# Patient Record
Sex: FEMALE | URBAN - METROPOLITAN AREA
[De-identification: names, ages, dates, MRNs, and addresses within clinical notes are randomized per-mention and may not be internally consistent; named-entity substitution may affect disease eponyms.]

---

## 2024-09-12 ENCOUNTER — APPOINTMENT (RX ONLY)
Dept: URBAN - METROPOLITAN AREA CLINIC 53 | Facility: CLINIC | Age: 34
Setting detail: DERMATOLOGY
End: 2024-09-12

## 2024-09-12 DIAGNOSIS — D22 MELANOCYTIC NEVI: ICD-10-CM | Status: INADEQUATELY CONTROLLED

## 2024-09-12 DIAGNOSIS — D485 NEOPLASM OF UNCERTAIN BEHAVIOR OF SKIN: ICD-10-CM | Status: INADEQUATELY CONTROLLED

## 2024-09-12 DIAGNOSIS — L82.0 INFLAMED SEBORRHEIC KERATOSIS: ICD-10-CM | Status: INADEQUATELY CONTROLLED

## 2024-09-12 PROBLEM — D22.5 MELANOCYTIC NEVI OF TRUNK: Status: ACTIVE | Noted: 2024-09-12

## 2024-09-12 PROBLEM — D48.5 NEOPLASM OF UNCERTAIN BEHAVIOR OF SKIN: Status: ACTIVE | Noted: 2024-09-12

## 2024-09-12 PROCEDURE — ? COUNSELING

## 2024-09-12 PROCEDURE — 11306 SHAVE SKIN LESION 0.6-1.0 CM: CPT

## 2024-09-12 PROCEDURE — 17110 DESTRUCTION B9 LES UP TO 14: CPT | Mod: 59

## 2024-09-12 PROCEDURE — ? LIQUID NITROGEN

## 2024-09-12 PROCEDURE — ? PRESCRIPTION

## 2024-09-12 PROCEDURE — ? EXCISION

## 2024-09-12 PROCEDURE — 13100 CMPLX RPR TRUNK 1.1-2.5 CM: CPT | Mod: 59

## 2024-09-12 PROCEDURE — ? SHAVE REMOVAL

## 2024-09-12 PROCEDURE — 11311 SHAVE SKIN LESION 0.6-1.0 CM: CPT

## 2024-09-12 PROCEDURE — 11403 EXC TR-EXT B9+MARG 2.1-3CM: CPT | Mod: 59

## 2024-09-12 RX ORDER — MUPIROCIN 20 MG/G
OINTMENT TOPICAL BID
Qty: 22 | Refills: 0 | Status: ERX | COMMUNITY
Start: 2024-09-12

## 2024-09-12 RX ADMIN — MUPIROCIN: 20 OINTMENT TOPICAL at 00:00

## 2024-09-12 ASSESSMENT — LOCATION ZONE DERM
LOCATION ZONE: FACE
LOCATION ZONE: TRUNK
LOCATION ZONE: SCALP
LOCATION ZONE: TRUNK
LOCATION ZONE: FACE

## 2024-09-12 ASSESSMENT — LOCATION DETAILED DESCRIPTION DERM
LOCATION DETAILED: RIGHT CENTRAL MALAR CHEEK
LOCATION DETAILED: RIGHT INFERIOR PREAURICULAR CHEEK
LOCATION DETAILED: LEFT MID-UPPER BACK
LOCATION DETAILED: RIGHT CENTRAL FRONTAL SCALP
LOCATION DETAILED: SUPERIOR THORACIC SPINE
LOCATION DETAILED: RIGHT LATERAL MALAR CHEEK
LOCATION DETAILED: LEFT INFERIOR MEDIAL UPPER BACK
LOCATION DETAILED: LEFT INFERIOR MEDIAL UPPER BACK
LOCATION DETAILED: LEFT MID-UPPER BACK

## 2024-09-12 ASSESSMENT — LOCATION SIMPLE DESCRIPTION DERM
LOCATION SIMPLE: RIGHT CHEEK
LOCATION SIMPLE: LEFT UPPER BACK
LOCATION SIMPLE: LEFT UPPER BACK
LOCATION SIMPLE: RIGHT CHEEK
LOCATION SIMPLE: SCALP
LOCATION SIMPLE: UPPER BACK

## 2024-09-25 ENCOUNTER — RX ONLY (OUTPATIENT)
Age: 34
Setting detail: RX ONLY
End: 2024-09-25

## 2024-09-25 ENCOUNTER — APPOINTMENT (RX ONLY)
Dept: URBAN - METROPOLITAN AREA CLINIC 53 | Facility: CLINIC | Age: 34
Setting detail: DERMATOLOGY
End: 2024-09-25

## 2024-09-25 DIAGNOSIS — D22 MELANOCYTIC NEVI: ICD-10-CM | Status: STABLE

## 2024-09-25 PROBLEM — D22.5 MELANOCYTIC NEVI OF TRUNK: Status: ACTIVE | Noted: 2024-09-25

## 2024-09-25 PROCEDURE — 99024 POSTOP FOLLOW-UP VISIT: CPT

## 2024-09-25 PROCEDURE — ? COUNSELING

## 2024-09-25 PROCEDURE — ? SUTURE REMOVAL (GLOBAL PERIOD)

## 2024-09-25 RX ORDER — MUPIROCIN 20 MG/G
OINTMENT TOPICAL BID
Qty: 22 | Refills: 0 | Status: ERX

## 2024-09-25 ASSESSMENT — LOCATION ZONE DERM: LOCATION ZONE: TRUNK

## 2024-09-25 ASSESSMENT — LOCATION SIMPLE DESCRIPTION DERM: LOCATION SIMPLE: UPPER BACK

## 2024-09-25 ASSESSMENT — LOCATION DETAILED DESCRIPTION DERM: LOCATION DETAILED: SUPERIOR THORACIC SPINE

## 2024-10-02 ENCOUNTER — RX ONLY (OUTPATIENT)
Age: 34
Setting detail: RX ONLY
End: 2024-10-02

## 2024-10-02 RX ORDER — CEPHALEXIN 500 MG/1
CAPSULE ORAL
Qty: 12 | Refills: 0 | Status: ERX | COMMUNITY
Start: 2024-10-02

## 2024-10-02 RX ORDER — MUPIROCIN 20 MG/G
OINTMENT TOPICAL BID
Qty: 22 | Refills: 0 | Status: ERX

## 2025-04-17 ENCOUNTER — APPOINTMENT (OUTPATIENT)
Dept: URBAN - METROPOLITAN AREA CLINIC 53 | Facility: CLINIC | Age: 35
Setting detail: DERMATOLOGY
End: 2025-04-17

## 2025-04-17 DIAGNOSIS — L91.0 HYPERTROPHIC SCAR: ICD-10-CM

## 2025-04-17 DIAGNOSIS — L81.4 OTHER MELANIN HYPERPIGMENTATION: ICD-10-CM

## 2025-04-17 DIAGNOSIS — D485 NEOPLASM OF UNCERTAIN BEHAVIOR OF SKIN: ICD-10-CM

## 2025-04-17 DIAGNOSIS — L82.1 OTHER SEBORRHEIC KERATOSIS: ICD-10-CM

## 2025-04-17 PROBLEM — D48.5 NEOPLASM OF UNCERTAIN BEHAVIOR OF SKIN: Status: ACTIVE | Noted: 2025-04-17

## 2025-04-17 PROCEDURE — ? SHAVE REMOVAL

## 2025-04-17 PROCEDURE — 99213 OFFICE O/P EST LOW 20 MIN: CPT | Mod: 25

## 2025-04-17 PROCEDURE — 11900 INJECT SKIN LESIONS </W 7: CPT | Mod: 59

## 2025-04-17 PROCEDURE — ? COUNSELING

## 2025-04-17 PROCEDURE — 11301 SHAVE SKIN LESION 0.6-1.0 CM: CPT

## 2025-04-17 PROCEDURE — ? INTRALESIONAL KENALOG

## 2025-04-17 ASSESSMENT — LOCATION SIMPLE DESCRIPTION DERM
LOCATION SIMPLE: LEFT UPPER BACK
LOCATION SIMPLE: RIGHT UPPER BACK
LOCATION SIMPLE: UPPER BACK
LOCATION SIMPLE: RIGHT BREAST

## 2025-04-17 ASSESSMENT — LOCATION ZONE DERM: LOCATION ZONE: TRUNK

## 2025-04-17 ASSESSMENT — LOCATION DETAILED DESCRIPTION DERM
LOCATION DETAILED: SUPERIOR THORACIC SPINE
LOCATION DETAILED: LEFT SUPERIOR UPPER BACK
LOCATION DETAILED: LEFT SUPERIOR MEDIAL UPPER BACK
LOCATION DETAILED: RIGHT MEDIAL BREAST 4-5:00 REGION
LOCATION DETAILED: RIGHT SUPERIOR MEDIAL UPPER BACK

## 2025-04-17 NOTE — PROCEDURE: SHAVE REMOVAL
Medical Necessity Information: It is in your best interest to select a reason for this procedure from the list below. All of these items fulfill various CMS LCD requirements except the new and changing color options.
Medical Necessity Clause: This procedure was medically necessary because the lesion that was treated was:
Lab: [id number]
Lab Facility: [id number]
Detail Level: Detailed
Was A Bandage Applied: Yes
Size Of Lesion In Cm (Required): 0.7
Depth Of Shave: dermis
Biopsy Method: Dermablade
Anesthesia Type: 1% lidocaine with epinephrine
Hemostasis: Drysol
Wound Care: Petrolatum
Path Notes (To The Dermatopathologist): Size: 0.7cm\\nR/O: SCC vs NUB
Render Path Notes In Note?: No
Consent was obtained from the patient. The risks and benefits to therapy were discussed in detail. Specifically, the risks of infection, scarring, bleeding, prolonged wound healing, incomplete removal, allergy to anesthesia, nerve injury and recurrence were addressed. Prior to the procedure, the treatment site was clearly identified and confirmed by the patient. All components of Universal Protocol/PAUSE Rule completed.
Post-Care Instructions: I reviewed with the patient in detail post-care instructions. Patient is to keep the biopsy site dry overnight, and then apply bacitracin twice daily until healed. Patient may apply hydrogen peroxide soaks to remove any crusting.
Notification Instructions: Patient will be notified of pathology results. However, patient instructed to call the office if not contacted within 2 weeks.
Billing Type: Third-Party Bill

## 2025-04-17 NOTE — PROCEDURE: INTRALESIONAL KENALOG
How Many Mls Were Removed From The 40 Mg/Ml (10ml) Vial When Preparing The Injectable Solution?: 0
Show Inventory Tab: Hide
Consent: The risks of atrophy were reviewed with the patient.
Ndc# For Kenalog Only: 3476-0834-73
Bill For Wasted Drug (Kenalog)?: no
Kenalog Type Of Vial: Single Dose
Total Volume (Ccs): 0.1
Validate Note Data When Using Inventory: Yes
Administered By (Optional): Mayur Wiggins PA-C
Detail Level: Detailed
Medical Necessity Clause: This procedure was medically necessary because the lesions that were treated were: causing patient emotional distress
Kenalog Preparation: Kenalog
Concentration Of Kenalog Solution Injected (Mg/Ml): 3.0
Concentration Of Kenalog Solution Injected (Mg/Ml): 10.0